# Patient Record
Sex: MALE | Race: WHITE | NOT HISPANIC OR LATINO | Employment: FULL TIME | URBAN - METROPOLITAN AREA
[De-identification: names, ages, dates, MRNs, and addresses within clinical notes are randomized per-mention and may not be internally consistent; named-entity substitution may affect disease eponyms.]

---

## 2017-02-22 ENCOUNTER — TELEPHONE (OUTPATIENT)
Dept: FAMILY MEDICINE | Facility: CLINIC | Age: 47
End: 2017-02-22

## 2017-02-22 NOTE — TELEPHONE ENCOUNTER
2/22/2017    Call Regarding ReattributionPhysical    Attempt 1    Message VM IS FULL    Comments:       Outreach   KV

## 2017-03-10 NOTE — TELEPHONE ENCOUNTER
3/10/2017    Call Regarding ReattributionPhysical    Attempt 2    Message Voicemail Full    Comments:       Outreach   CC

## 2017-03-29 NOTE — TELEPHONE ENCOUNTER
3/29/2017    Call Regarding ReattributionPhysical    Attempt 3    Message with female    Comments:       Outreach   jesse

## 2019-10-01 ENCOUNTER — HEALTH MAINTENANCE LETTER (OUTPATIENT)
Age: 49
End: 2019-10-01

## 2021-01-15 ENCOUNTER — HEALTH MAINTENANCE LETTER (OUTPATIENT)
Age: 51
End: 2021-01-15

## 2021-10-24 ENCOUNTER — HEALTH MAINTENANCE LETTER (OUTPATIENT)
Age: 51
End: 2021-10-24

## 2022-02-13 ENCOUNTER — HEALTH MAINTENANCE LETTER (OUTPATIENT)
Age: 52
End: 2022-02-13

## 2022-10-16 ENCOUNTER — HEALTH MAINTENANCE LETTER (OUTPATIENT)
Age: 52
End: 2022-10-16

## 2023-03-26 ENCOUNTER — HEALTH MAINTENANCE LETTER (OUTPATIENT)
Age: 53
End: 2023-03-26

## 2024-01-12 NOTE — PROGRESS NOTES
Colon and Rectal Surgery Clinic Note    RE: Ehsan TRENT Dex.  : 1970.  SHIVA: 2024.    Reason for visit: Discuss hemorrhoidectomy.     HPI: 53 year old male with personal history of colon polyps who presents with chronic and intermittent hemorrhoid symptoms.  These include intermittent swelling and perianal skin irritation. Denies fevers, chills or blood per rectum.  Tolerating diet well.  No unintentional weight loss.  He also has noticed most recently something slightly more internal that he feels like it could be a polyp.  Denies family history of colorectal cancer or inflammatory bowel disease.  No previous anorectal operations.  Denies fecal incontinence.      Colonoscopy 2022 (due in 2027)  Findings:   Polyp location: transverse colon.  Quantity: 1.  Size: 3 mm.  Polyp shape:  sessile.         Maneuver: polypectomy was performed with a cold snare.        Removal:  complete.  Retrieval: complete.  Bleeding: none.   Remainder of the exam is normal.     Pathology Results:   A: COLON, TRANSVERSE, POLYP:           1. Sessile serrated adenoma           2. Negative for overt dysplasia           3. Per the colonoscopy report:               a. Polyp size: 3 mm               b. Resection: Complete               c. Retrieval: Complete     Colonoscopy 2017  Findings:   Polyp location: ascending colon.  Quantity: 1.  Size: 2 mm.  Polyp shape:  sessile.         Maneuver: polypectomy was performed with a cold biopsy forceps.        Removal:  complete.  Retrieval: complete.  Bleeding: none.   Remainder of the exam is normal.     Impression:   Family history of polyps in the colon   Colorectal polyp detected on colonoscopy     Pathology Results:   A: COLON, ASCENDING,  POLYP:   1. Sessile serrated adenoma   2. No overt dysplasia present   3. Per the attached endoscopy report:     a. Polyp size: 2mm     b. Resection: Complete     c. Retrieval: Complete     Medical history:  No past medical history on  file.    Surgical history:  No past surgical history on file.    Family history:  Family History   Problem Relation Age of Onset    Breast Cancer Mother     Breast Cancer Maternal Grandmother     Cancer Maternal Grandmother         bone cancer    Cancer Paternal Grandmother         kidney cancer     Heart Disease Mother     Osteoporosis Mother        Medications:  Current Outpatient Medications   Medication Sig Dispense Refill    Cholecalciferol (VITAMIN D) 1000 UNIT capsule Take 1 capsule by mouth daily.      DAILY MULTIVITAMIN PO 1 tablet daily       FISH OIL PO 1 capsule daily      fluticasone (FLONASE) 50 MCG/ACT nasal spray Spray 1-2 sprays into both nostrils daily 1 Package prn       Allergies:  No Known Allergies    Social history:   Social History     Tobacco Use    Smoking status: Never    Smokeless tobacco: Never   Substance Use Topics    Alcohol use: Yes     Marital status: single.    Physical Examination:  /74 (BP Location: Right arm, Patient Position: Sitting, Cuff Size: Adult Regular)   Pulse 66   Resp 16   Wt 79.4 kg (175 lb)   SpO2 98%   BMI 24.24 kg/m    General: Well hydrated. No acute distress.  Perianal external examination:  Perianal skin: Mild irritation and some lichenification circumferentially.  Lesions: No.  Eversion of buttocks: There was not evidence of an anal fissure. Details: N/A.  Skin tags or external hemorrhoids: Yes: Medium to large located predominantly at the left posterior and right anterior aspects of the anus.  I did not see or palpate any polyps.  No evidence of active either bleeding or thrombosis.  Digital rectal examination: Was performed.   Sphincter tone: Good.  Palpable lesions: No.  Other: None.  Bimanual examination: was not performed.    Anoscopy: Was performed.   Hemorrhoids: Yes.  Grade 2 internal hemorrhoids mainly at the right anterior and left lateral positions.  No evidence of active bleeding or thrombosis.  Lesions:  No    Investigations:  None.    Procedures:  None.    ASSESSMENT  53-year-old male with symptomatic mixed hemorrhoids. Risks, benefits, and alternatives of operative treatment were thoroughly discussed with the patient, he/she understands these well and agrees to proceed.    PLAN  1.  High-fiber diet.  Would recommend fiber supplement twice daily and MiraLAX.  Constipation.  Increase water consumption to at least 50-60 ounces per day.  2.  Use Calmoseptine on perianal skin for irritation.  Also recommended witch hazel wipes for when he has irritation.  3.  Ehsan is definitely a candidate for hemorrhoidectomy but I did discuss the operation and typical recovery.  With his degree of hemorrhoids, this is mainly a quality-of-life issue on how much his hemorrhoids are really bothering him.  If he would like to try topicals and a fiber supplement first and then proceed to surgery that will be largely up to him.  He says that he has tried multiple things in the past but he still has daily issues with his hemorrhoids.    30 minutes spent on the date of encounter performing chart review, history and exam, documentation and further activities as noted above with an additional 5 minutes for anoscopy.     Mario Trinidad MD, MSc, FACS, FASCRS.    Chief, Division of Colon & Rectal Surgery  Stanley M. Goldberg, MD Endowed Chair, Colon & Rectal Surgery  Department of Surgery  [facility]      Referring Provider:  No referring provider defined for this encounter.     Primary Care Provider:  Primitivo Scruggs

## 2024-01-30 ENCOUNTER — PRE VISIT (OUTPATIENT)
Dept: SURGERY | Facility: CLINIC | Age: 54
End: 2024-01-30
Payer: COMMERCIAL

## 2024-01-30 NOTE — CONFIDENTIAL NOTE
COLON AND RECTAL SURGERY PRE-VISIT:  Diagnosis, Referred by & from: Hemorrhoids. Self-referral.   Appt date: 2/9/2024 with Dr. Trinidad at [facility]   NOTES STATUS DETAILS   OFFICE NOTE from referring provider N/A    OFFICE NOTE from other specialist Care Everywhere [facility]:  1/8/2024- Dr. Scruggs   DISCHARGE SUMMARY from hospital N/A    DISCHARGE REPORT from the ER N/A    OPERATIVE REPORT N/A    MEDICATION LIST N/A    LABS N/A    DIAGNOSTIC PROCEDURES     PFC TESTING (from the Pelvic floor center includes Manometry, PDNL, EMG, etc.) N/A    COLONOSCOPY Care Everywhere [facility]:  8/10/2022- Dr. Scruggs   UPPER ENDOSCOPY (EGD) N/A    FLEX SIGMOIDOSCOPY N/A    ERCP N/A    IMAGING (DISC & REPORT) N/A       Records Requested   01/31/2024 at 9:30 am:   [x] Sent [employer] to verify most recent scope.  [x] Sent Fax to [facility].    2/6/2024 at 11:45 am:   No records received from [facility]. No other records requested at this time. PRE-VISIT COMPLETE.    All relevant records are bookmarked under DAYANA Uriarte.      Kelly Denise NREMT  Colon and Rectal Surgery

## 2024-02-09 ENCOUNTER — OFFICE VISIT (OUTPATIENT)
Dept: SURGERY | Facility: CLINIC | Age: 54
End: 2024-02-09
Payer: COMMERCIAL

## 2024-02-09 VITALS
DIASTOLIC BLOOD PRESSURE: 74 MMHG | BODY MASS INDEX: 24.24 KG/M2 | OXYGEN SATURATION: 98 % | WEIGHT: 175 LBS | SYSTOLIC BLOOD PRESSURE: 119 MMHG | RESPIRATION RATE: 16 BRPM | HEART RATE: 66 BPM

## 2024-02-09 DIAGNOSIS — K64.4 EXTERNAL HEMORRHOIDS: Primary | ICD-10-CM

## 2024-02-09 PROCEDURE — 46600 DIAGNOSTIC ANOSCOPY SPX: CPT | Performed by: COLON & RECTAL SURGERY

## 2024-02-09 PROCEDURE — 99203 OFFICE O/P NEW LOW 30 MIN: CPT | Mod: 25 | Performed by: COLON & RECTAL SURGERY

## 2024-02-09 ASSESSMENT — PAIN SCALES - GENERAL: PAINLEVEL: NO PAIN (0)

## 2024-02-09 NOTE — NURSING NOTE
Chief Complaint   Patient presents with    New Patient     Possible rectal polyp       Vitals:    02/09/24 1407   BP: 119/74   BP Location: Right arm   Patient Position: Sitting   Cuff Size: Adult Regular   Pulse: 66   Resp: 16   SpO2: 98%   Weight: 79.4 kg (175 lb)       Body mass index is 24.24 kg/m .

## 2024-02-09 NOTE — LETTER
2024         RE: Ehsan Kowalski  [id number] [facility] [address]        Dear Colleague,    Thank you for referring your patient, Ehsan Kowalski, to the [facility] [place]. Please see a copy of my visit note below.    Colon and Rectal Surgery Clinic Note    RE: Ehsan Kowalski.  : 1970.  SHIVA: 2024.    Reason for visit: Discuss hemorrhoidectomy.     HPI: 53 year old male with personal history of colon polyps who presents with chronic and intermittent hemorrhoid symptoms.  These include intermittent swelling and perianal skin irritation. Denies fevers, chills or blood per rectum.  Tolerating diet well.  No unintentional weight loss.  He also has noticed most recently something slightly more internal that he feels like it could be a polyp.  Denies family history of colorectal cancer or inflammatory bowel disease.  No previous anorectal operations.  Denies fecal incontinence.      Colonoscopy 2022 (due in 2027)  Findings:   Polyp location: transverse colon.  Quantity: 1.  Size: 3 mm.  Polyp shape:  sessile.         Maneuver: polypectomy was performed with a cold snare.        Removal:  complete.  Retrieval: complete.  Bleeding: none.   Remainder of the exam is normal.     Pathology Results:   A: COLON, TRANSVERSE, POLYP:           1. Sessile serrated adenoma           2. Negative for overt dysplasia           3. Per the colonoscopy report:               a. Polyp size: 3 mm               b. Resection: Complete               c. Retrieval: Complete     Colonoscopy 2017  Findings:   Polyp location: ascending colon.  Quantity: 1.  Size: 2 mm.  Polyp shape:  sessile.         Maneuver: polypectomy was performed with a cold biopsy forceps.        Removal:  complete.  Retrieval: complete.  Bleeding: none.   Remainder of the exam is normal.     Impression:   Family history of polyps in the colon   Colorectal polyp detected on colonoscopy     Pathology Results:   A: COLON,  ASCENDING,  POLYP:   1. Sessile serrated adenoma   2. No overt dysplasia present   3. Per the attached endoscopy report:     a. Polyp size: 2mm     b. Resection: Complete     c. Retrieval: Complete     Medical history:  No past medical history on file.    Surgical history:  No past surgical history on file.    Family history:  Family History   Problem Relation Age of Onset     Breast Cancer Mother      Breast Cancer Maternal Grandmother      Cancer Maternal Grandmother         bone cancer     Cancer Paternal Grandmother         kidney cancer      Heart Disease Mother      Osteoporosis Mother        Medications:  Current Outpatient Medications   Medication Sig Dispense Refill     Cholecalciferol (VITAMIN D) 1000 UNIT capsule Take 1 capsule by mouth daily.       DAILY MULTIVITAMIN PO 1 tablet daily        FISH OIL PO 1 capsule daily       fluticasone (FLONASE) 50 MCG/ACT nasal spray Spray 1-2 sprays into both nostrils daily 1 Package prn       Allergies:  No Known Allergies    Social history:   Social History     Tobacco Use     Smoking status: Never     Smokeless tobacco: Never   Substance Use Topics     Alcohol use: Yes     Marital status: single.    Physical Examination:  /74 (BP Location: Right arm, Patient Position: Sitting, Cuff Size: Adult Regular)   Pulse 66   Resp 16   Wt 79.4 kg (175 lb)   SpO2 98%   BMI 24.24 kg/m    General: Well hydrated. No acute distress.  Perianal external examination:  Perianal skin: Mild irritation and some lichenification circumferentially.  Lesions: No.  Eversion of buttocks: There was not evidence of an anal fissure. Details: N/A.  Skin tags or external hemorrhoids: Yes: Medium to large located predominantly at the left posterior and right anterior aspects of the anus.  I did not see or palpate any polyps.  No evidence of active either bleeding or thrombosis.  Digital rectal examination: Was performed.   Sphincter tone: Good.  Palpable lesions: No.  Other:  None.  Bimanual examination: was not performed.    Anoscopy: Was performed.   Hemorrhoids: Yes.  Grade 2 internal hemorrhoids mainly at the right anterior and left lateral positions.  No evidence of active bleeding or thrombosis.  Lesions: No    Investigations:  None.    Procedures:  None.    ASSESSMENT  53-year-old male with symptomatic mixed hemorrhoids. Risks, benefits, and alternatives of operative treatment were thoroughly discussed with the patient, he/she understands these well and agrees to proceed.    PLAN  1.  High-fiber diet.  Would recommend fiber supplement twice daily and MiraLAX.  Constipation.  Increase water consumption to at least 50-60 ounces per day.  2.  Use Calmoseptine on perianal skin for irritation.  Also recommended witch hazel wipes for when he has irritation.  3.  Ehsan is definitely a candidate for hemorrhoidectomy but I did discuss the operation and typical recovery.  With his degree of hemorrhoids, this is mainly a quality-of-life issue on how much his hemorrhoids are really bothering him.  If he would like to try topicals and a fiber supplement first and then proceed to surgery that will be largely up to him.  He says that he has tried multiple things in the past but he still has daily issues with his hemorrhoids.    30 minutes spent on the date of encounter performing chart review, history and exam, documentation and further activities as noted above with an additional 5 minutes for anoscopy.     Mario Trinidad MD, MSc, FACS, FASCRS.    Chief, Division of Colon & Rectal Surgery  Stanley M. Goldberg, MD Endowed Chair, Colon & Rectal Surgery  Department of Surgery  [facility]      Referring Provider:  No referring provider defined for this encounter.     Primary Care Provider:  Primitivo Scruggs, thank you for allowing me to participate in the care of your patient.        Sincerely,        Mario Trinidad MD

## 2024-02-09 NOTE — PATIENT INSTRUCTIONS
Follow up:  - if you want to move forward with surgery    Please call with any questions or concerns regarding your clinic visit today.    It is a pleasure being involved in your health care.    Contacts post-consultation depending on your need:    Radiology Appointments [phone]    Schedule Clinic Appointments [phone] # 1   M-F 7:30 - 5 pm    [facility] GILLETTE [phone]    Clinic Fax Number [phone]    Surgery Scheduling [phone]    My Chart is available 24 hours a day and is a secure way to access your records and communicate with your care team.  I strongly recommend signing up if you haven't already done so, if you are comfortable with computers.  If you would like to inquire about this or are having problems with My Chart access, you may call [phone] or go online at [email or web address].  Please allow at least 24 hours for a response and extra time on weekends and Holidays.

## 2024-02-16 ENCOUNTER — TELEPHONE (OUTPATIENT)
Dept: SURGERY | Facility: CLINIC | Age: 54
End: 2024-02-16
Payer: COMMERCIAL

## 2024-02-16 NOTE — TELEPHONE ENCOUNTER
Left voicemail for patient regarding scheduling surgery with Dr. Mario Trinidad.    Provided contact number to discuss.    P: [phone]    __    Ava Banerjee  Vandana-op Coordinator  [facility]  Direct Phone: [phone]

## 2024-03-05 NOTE — TELEPHONE ENCOUNTER
Left voicemail for patient regarding scheduling outpatient surgery with Dr. Mario Trinidad.    Also sent [employer] message to patient requesting that he reply or call back regarding scheduling outpatient surgery with Dr. Mario Trinidad.    Provided contact number to discuss.    P: [phone]    __    Ava Banerjee  Vandana-op Coordinator  [facility]  Direct Phone: [phone]

## 2024-03-14 NOTE — TELEPHONE ENCOUNTER
Left voicemail for patient regarding scheduling surgery with Dr. Mario Trinidad.    Also mailed letter to patient at address on-file requesting that he call me to schedule surgery since I have been unable to reach him via phone or by [employer].    Provided call back number:    P: [phone]    __    Ava Banerjee  Vandana-op Coordinator  [facility]  Direct Phone: [phone]

## 2025-02-22 ENCOUNTER — HEALTH MAINTENANCE LETTER (OUTPATIENT)
Age: 55
End: 2025-02-22